# Patient Record
Sex: MALE | Race: WHITE | NOT HISPANIC OR LATINO | Employment: FULL TIME | URBAN - METROPOLITAN AREA
[De-identification: names, ages, dates, MRNs, and addresses within clinical notes are randomized per-mention and may not be internally consistent; named-entity substitution may affect disease eponyms.]

---

## 2018-02-09 ENCOUNTER — CONVERSION ENCOUNTER (OUTPATIENT)
Dept: SURGERY | Facility: CLINIC | Age: 38
End: 2018-02-09

## 2018-02-09 ENCOUNTER — PROCEDURE VISIT CONVERTED (OUTPATIENT)
Dept: UROLOGY | Facility: CLINIC | Age: 38
End: 2018-02-09
Attending: UROLOGY

## 2018-04-27 ENCOUNTER — OFFICE VISIT CONVERTED (OUTPATIENT)
Dept: UROLOGY | Facility: CLINIC | Age: 38
End: 2018-04-27
Attending: UROLOGY

## 2021-05-16 VITALS — RESPIRATION RATE: 14 BRPM | WEIGHT: 173 LBS | HEIGHT: 70 IN | BODY MASS INDEX: 24.77 KG/M2

## 2021-08-02 PROBLEM — K21.9 GERD (GASTROESOPHAGEAL REFLUX DISEASE): Status: ACTIVE | Noted: 2021-08-02

## 2021-08-02 PROBLEM — J45.909 ASTHMA: Status: ACTIVE | Noted: 2021-08-02

## 2021-08-02 RX ORDER — ESOMEPRAZOLE MAGNESIUM 40 MG/1
CAPSULE, DELAYED RELEASE ORAL
COMMUNITY
End: 2022-01-14

## 2021-08-02 RX ORDER — LORATADINE 10 MG/1
TABLET ORAL
COMMUNITY

## 2021-08-03 ENCOUNTER — OFFICE VISIT (OUTPATIENT)
Dept: GASTROENTEROLOGY | Facility: CLINIC | Age: 41
End: 2021-08-03

## 2021-08-03 ENCOUNTER — PREP FOR SURGERY (OUTPATIENT)
Dept: OTHER | Facility: HOSPITAL | Age: 41
End: 2021-08-03

## 2021-08-03 VITALS
SYSTOLIC BLOOD PRESSURE: 125 MMHG | DIASTOLIC BLOOD PRESSURE: 82 MMHG | BODY MASS INDEX: 25.56 KG/M2 | WEIGHT: 178.5 LBS | OXYGEN SATURATION: 97 % | HEART RATE: 99 BPM | HEIGHT: 70 IN

## 2021-08-03 DIAGNOSIS — K21.9 GASTROESOPHAGEAL REFLUX DISEASE WITHOUT ESOPHAGITIS: Primary | ICD-10-CM

## 2021-08-03 DIAGNOSIS — K21.9 GERD (GASTROESOPHAGEAL REFLUX DISEASE): Primary | ICD-10-CM

## 2021-08-03 DIAGNOSIS — R19.4 CHANGE IN BOWEL HABIT: ICD-10-CM

## 2021-08-03 DIAGNOSIS — R19.4 CHANGE IN BOWEL HABITS: ICD-10-CM

## 2021-08-03 PROCEDURE — 99204 OFFICE O/P NEW MOD 45 MIN: CPT | Performed by: INTERNAL MEDICINE

## 2021-08-03 NOTE — PROGRESS NOTES
"Chief Complaint      Manuel Collazo is a 41 y.o. male who presents to [facility]STROENTEROLOG[facility] for     Result Review :   The following data was reviewed by: Camilo Hussein MD on 08/03/2021:             Past Medical History:   Diagnosis Date   • Allergies    • Asthma    • GERD (gastroesophageal reflux disease)        Past Surgical History:   Procedure Laterality Date   • APPENDECTOMY     • ENDOSCOPY  2017   • SINUS SURGERY           Current Outpatient Medications:   •  esomeprazole (nexIUM) 40 MG capsule, Nexium 40 mg oral capsule,delayed release(DR/EC) take 1 capsule (40 mg) by oral route once daily   Active, Disp: , Rfl:   •  loratadine (Claritin) 10 MG tablet, Claritin 10 mg oral tablet take 1 tablet (10 mg) by oral route once daily   Active, Disp: , Rfl:      No Known Allergies    Family History   Problem Relation Age of Onset   • Diabetes Father    • Urolithiasis Paternal Grandmother    • Urolithiasis Paternal Grandfather         Social History     Social History Narrative   • Not on file       Review of Systems -all other systems reviewed and are otherwise negative except for that mentioned previously in the HPI    Objective     Vital Signs:   /82 (BP Location: Right arm, Patient Position: Sitting, Cuff Size: Adult)   Pulse 99   Ht 177.8 cm (70\")   Wt 81 kg (178 lb 8 oz)   SpO2 97%   BMI 25.61 kg/m²     Body mass index is 25.61 kg/m².    Physical Exam            Assessment and Plan    Diagnoses and all orders for this visit:    1. Gastroesophageal reflux disease without esophagitis (Primary)    2. Change in bowel habits                    Follow Up   No follow-ups on file.  Patient was given instructions and counseling regarding his condition or for health maintenance advice. Please see specific information pulled into the AVS if appropriate.     "

## 2021-08-03 NOTE — PROGRESS NOTES
Chief Complaint      Manuel Collazo is a 41 y.o. male who presents to [facility] GASTROENTEROLOGY for evaluation of altered bowel habits which has been ongoing now for several years.  Patient describes intermittent episodes of abdominal cramping with diarrhea.  It tends to be caused by certain foods such as salad and beans which he has now avoided.  He tends to have symptoms on average once every 1 to 2 weeks.  He has never had prior colonoscopy.  He denies any rectal bleeding.  The patient also has a history of hiatal hernia and acid reflux symptoms.  The symptoms tend to be worse when eating spicy foods or lying supine.  He takes Protonix daily and has good control with these medications.  If he misses the dose for more than 2 days he can have quick return of those reflux symptoms.  He describes a substernal burning with radiation upwards.  He denies any dysphagia or weight loss.  He had an upper endoscopy several years ago.  It was suggested at that time that he had eosinophilic esophagitis and dilation was performed.  Maximal dilation diameter was 16.5 mm.    Result Review :   The following data was reviewed by: Camilo Hussein MD on 08/03/2021:              Data reviewed: GI studies EGD from May 2017     Past Medical History:   Diagnosis Date   • Allergies    • Asthma    • GERD (gastroesophageal reflux disease)        Past Surgical History:   Procedure Laterality Date   • APPENDECTOMY     • ENDOSCOPY  2017   • SINUS SURGERY           Current Outpatient Medications:   •  esomeprazole (nexIUM) 40 MG capsule, Nexium 40 mg oral capsule,delayed release(DR/EC) take 1 capsule (40 mg) by oral route once daily   Active, Disp: , Rfl:   •  loratadine (Claritin) 10 MG tablet, Claritin 10 mg oral tablet take 1 tablet (10 mg) by oral route once daily   Active, Disp: , Rfl:      No Known Allergies    Family History   Problem Relation Age of Onset   • Diabetes Father    • Urolithiasis Paternal  "Grandmother    • Urolithiasis Paternal Grandfather         Social History     Social History Narrative   • Not on file       Review of Systems -all other systems reviewed and are otherwise negative except for that mentioned previously in the HPI    Objective     Vital Signs:   /82 (BP Location: Right arm, Patient Position: Sitting, Cuff Size: Adult)   Pulse 99   Ht 177.8 cm (70\")   Wt 81 kg (178 lb 8 oz)   SpO2 97%   BMI 25.61 kg/m²     Body mass index is 25.61 kg/m².    Physical Exam  Constitutional:       General: He is not in acute distress.     Appearance: Normal appearance. He is well-developed and normal weight.   Eyes:      Conjunctiva/sclera: Conjunctivae normal.      Pupils: Pupils are equal, round, and reactive to light.      Visual Fields: Right eye visual fields normal and left eye visual fields normal.   Cardiovascular:      Rate and Rhythm: Normal rate and regular rhythm.      Heart sounds: Normal heart sounds.   Pulmonary:      Effort: Pulmonary effort is normal. No retractions.      Breath sounds: Normal breath sounds and air entry.      Comments: Inspection of chest: normal appearance  Abdominal:      General: Bowel sounds are normal.      Palpations: Abdomen is soft.      Tenderness: There is no abdominal tenderness.      Comments: No appreciable hepatosplenomegaly   Musculoskeletal:      Cervical back: Neck supple.      Right lower leg: No edema.      Left lower leg: No edema.   Lymphadenopathy:      Cervical: No cervical adenopathy.   Skin:     Findings: No lesion.      Comments: Turgor normal   Neurological:      Mental Status: He is alert and oriented to person, place, and time.   Psychiatric:         Mood and Affect: Mood and affect normal.                 Assessment and Plan    Diagnoses and all orders for this visit:    1. Gastroesophageal reflux disease without esophagitis (Primary)    2. Change in bowel habits    I will schedule the patient for an upper endoscopy given his long " history of acid reflux symptoms and possible eosinophilic esophagitis in the past.  He will take Protonix daily.  For his altered bowel habits I think he benefit from a colonoscopy to rule out underlying Crohn's disease or colitis.  If his colonoscopy is unremarkable then I suspect he has irritable bowel syndrome and may benefit from a trial of fiber although he has done fairly well with simple avoidance of dietary triggers such as salads and beans.  I discussed risk and benefits performing endoscopy and he is willing to proceed.                Follow Up   No follow-ups on file.  Patient was given instructions and counseling regarding his condition or for health maintenance advice. Please see specific information pulled into the AVS if appropriate.

## 2022-01-14 ENCOUNTER — TELEPHONE (OUTPATIENT)
Dept: GASTROENTEROLOGY | Facility: CLINIC | Age: 42
End: 2022-01-14

## 2022-01-14 RX ORDER — PANTOPRAZOLE SODIUM 40 MG/1
40 TABLET, DELAYED RELEASE ORAL DAILY
COMMUNITY
End: 2022-02-08 | Stop reason: SDUPTHER

## 2022-01-14 NOTE — TELEPHONE ENCOUNTER
Patient notified per Dr Hussein's request to ensure the patient that endo will be open and number giver to patient to call in the event they could not make it.

## 2022-01-14 NOTE — PRE-PROCEDURE INSTRUCTIONS
Pt. Instructed on laxative and skin prep, pre-op meds, clear liquid diet. Ok to take Loratadine, Pantoprazole a.m. of procedure.       Covid vaccine card scanned to chart.

## 2022-01-17 ENCOUNTER — ANESTHESIA (OUTPATIENT)
Dept: GASTROENTEROLOGY | Facility: HOSPITAL | Age: 42
End: 2022-01-17

## 2022-01-17 ENCOUNTER — ANESTHESIA EVENT (OUTPATIENT)
Dept: GASTROENTEROLOGY | Facility: HOSPITAL | Age: 42
End: 2022-01-17

## 2022-01-17 ENCOUNTER — HOSPITAL ENCOUNTER (OUTPATIENT)
Facility: HOSPITAL | Age: 42
Setting detail: HOSPITAL OUTPATIENT SURGERY
Discharge: HOME | End: 2022-01-17
Attending: INTERNAL MEDICINE | Admitting: INTERNAL MEDICINE

## 2022-01-17 VITALS
DIASTOLIC BLOOD PRESSURE: 83 MMHG | TEMPERATURE: 97.2 F | WEIGHT: 176.81 LBS | BODY MASS INDEX: 25.31 KG/M2 | OXYGEN SATURATION: 99 % | RESPIRATION RATE: 21 BRPM | SYSTOLIC BLOOD PRESSURE: 106 MMHG | HEART RATE: 88 BPM | HEIGHT: 70 IN

## 2022-01-17 DIAGNOSIS — K21.9 GERD (GASTROESOPHAGEAL REFLUX DISEASE): ICD-10-CM

## 2022-01-17 DIAGNOSIS — R19.4 CHANGE IN BOWEL HABIT: ICD-10-CM

## 2022-01-17 PROCEDURE — 88305 TISSUE EXAM BY PATHOLOGIST: CPT | Performed by: INTERNAL MEDICINE

## 2022-01-17 PROCEDURE — 45378 DIAGNOSTIC COLONOSCOPY: CPT | Performed by: INTERNAL MEDICINE

## 2022-01-17 PROCEDURE — 43239 EGD BIOPSY SINGLE/MULTIPLE: CPT | Performed by: INTERNAL MEDICINE

## 2022-01-17 PROCEDURE — 25010000002 PROPOFOL 10 MG/ML EMULSION: Performed by: NURSE ANESTHETIST, CERTIFIED REGISTERED

## 2022-01-17 RX ORDER — PROPOFOL 10 MG/ML
VIAL (ML) INTRAVENOUS AS NEEDED
Status: DISCONTINUED | OUTPATIENT
Start: 2022-01-17 | End: 2022-01-17 | Stop reason: SURG

## 2022-01-17 RX ORDER — LIDOCAINE HYDROCHLORIDE 20 MG/ML
INJECTION, SOLUTION INFILTRATION; PERINEURAL AS NEEDED
Status: DISCONTINUED | OUTPATIENT
Start: 2022-01-17 | End: 2022-01-17 | Stop reason: SURG

## 2022-01-17 RX ORDER — SODIUM CHLORIDE, SODIUM LACTATE, POTASSIUM CHLORIDE, CALCIUM CHLORIDE 600; 310; 30; 20 MG/100ML; MG/100ML; MG/100ML; MG/100ML
1000 INJECTION, SOLUTION INTRAVENOUS CONTINUOUS
Status: DISCONTINUED | OUTPATIENT
Start: 2022-01-17 | End: 2022-01-17 | Stop reason: HOSPADM

## 2022-01-17 RX ORDER — SODIUM CHLORIDE, SODIUM LACTATE, POTASSIUM CHLORIDE, CALCIUM CHLORIDE 600; 310; 30; 20 MG/100ML; MG/100ML; MG/100ML; MG/100ML
30 INJECTION, SOLUTION INTRAVENOUS CONTINUOUS
Status: DISCONTINUED | OUTPATIENT
Start: 2022-01-17 | End: 2022-01-17 | Stop reason: HOSPADM

## 2022-01-17 RX ORDER — SODIUM CHLORIDE 0.9 % (FLUSH) 0.9 %
10 SYRINGE (ML) INJECTION EVERY 12 HOURS SCHEDULED
Status: DISCONTINUED | OUTPATIENT
Start: 2022-01-17 | End: 2022-01-17 | Stop reason: HOSPADM

## 2022-01-17 RX ORDER — MIDAZOLAM HYDROCHLORIDE 1 MG/ML
1 INJECTION INTRAMUSCULAR; INTRAVENOUS
Status: DISCONTINUED | OUTPATIENT
Start: 2022-01-17 | End: 2022-01-17

## 2022-01-17 RX ORDER — SODIUM CHLORIDE 0.9 % (FLUSH) 0.9 %
10 SYRINGE (ML) INJECTION AS NEEDED
Status: DISCONTINUED | OUTPATIENT
Start: 2022-01-17 | End: 2022-01-17 | Stop reason: HOSPADM

## 2022-01-17 RX ADMIN — LIDOCAINE HYDROCHLORIDE 80 MG: 20 INJECTION, SOLUTION INFILTRATION; PERINEURAL at 07:46

## 2022-01-17 RX ADMIN — PROPOFOL 80 MG: 10 INJECTION, EMULSION INTRAVENOUS at 07:46

## 2022-01-17 RX ADMIN — SODIUM CHLORIDE, POTASSIUM CHLORIDE, SODIUM LACTATE AND CALCIUM CHLORIDE 1000 ML: 600; 310; 30; 20 INJECTION, SOLUTION INTRAVENOUS at 06:39

## 2022-01-17 RX ADMIN — PROPOFOL 250 MCG/KG/MIN: 10 INJECTION, EMULSION INTRAVENOUS at 07:46

## 2022-01-17 NOTE — ANESTHESIA POSTPROCEDURE EVALUATION
Med refill:  irbesartan (AVAPRO) 300 mg tablet    [place] Patient: Manuel Collazo    Procedure Summary     Date: 01/17/22 Room / Location: [facility] ENDOSCOPY 2 / [facility] ENDOSCOPY    Anesthesia Start: 0744 Anesthesia Stop: 0822    Procedures:       ESOPHAGOGASTRODUODENOSCOPY WITH BIOPSY/DILATATION #48 SHEN (N/A )      COLONOSCOPY (N/A ) Diagnosis:       GERD (gastroesophageal reflux disease)      Change in bowel habit      (GERD (gastroesophageal reflux disease) [K21.9])      (Change in bowel habit [R19.4])    Surgeons: Camilo Hussein MD Provider: Selina Steiner MD    Anesthesia Type: general, MAC ASA Status: 2          Anesthesia Type: general, MAC    Vitals  Vitals Value Taken Time   /83 01/17/22 0844   Temp 36.2 °C (97.2 °F) 01/17/22 0844   Pulse 88 01/17/22 0844   Resp 21 01/17/22 0844   SpO2 99 % 01/17/22 0844           Post Anesthesia Care and Evaluation    Patient location during evaluation: bedside  Patient participation: complete - patient participated  Level of consciousness: awake  Pain score: 0  Pain management: adequate  Airway patency: patent  Anesthetic complications: No anesthetic complications  PONV Status: none  Cardiovascular status: acceptable and stable  Respiratory status: acceptable and room air  Hydration status: acceptable    Comments: An Anesthesiologist personally participated in the most demanding procedures (including induction and emergence if applicable) in the anesthesia plan, monitored the course of anesthesia administration at frequent intervals and remained physically present and available for immediate diagnosis and treatment of emergencies.

## 2022-01-17 NOTE — ANESTHESIA PREPROCEDURE EVALUATION
Anesthesia Evaluation     Patient summary reviewed and Nursing notes reviewed   no history of anesthetic complications:  NPO Solid Status: > 8 hours  NPO Liquid Status: > 2 hours           Airway   Mallampati: III  TM distance: <3 FB  Neck ROM: full  Possible difficult intubation and Narrow palate  Dental      Pulmonary - normal exam    breath sounds clear to auscultation  (+) asthma,  Cardiovascular - negative cardio ROS and normal exam  Exercise tolerance: good (4-7 METS)    Rhythm: regular        Neuro/Psych- negative ROS  GI/Hepatic/Renal/Endo    (+)  hiatal hernia, GERD,      Musculoskeletal (-) negative ROS    Abdominal    Substance History - negative use     OB/GYN negative ob/gyn ROS         Other - negative ROS                       Anesthesia Plan    ASA 2     general and MAC   (Patient understands anesthesia not responsible for dental damage.)  intravenous induction     Anesthetic plan, all risks, benefits, and alternatives have been provided, discussed and informed consent has been obtained with: patient.  Use of blood products discussed with patient .   Plan discussed with CRNA.

## 2022-01-17 NOTE — H&P
"Pre Procedure History & Physical    Chief Complaint:   gerd  Change in bowel habits    Subjective     HPI:   As above    Past Medical History:   Past Medical History:   Diagnosis Date   • Allergies    • Anesthesia complication     rash on chest x1   • Asthma     in high school   • GERD (gastroesophageal reflux disease)    • Hiatal hernia        Past Surgical History:  Past Surgical History:   Procedure Laterality Date   • APPENDECTOMY     • ENDOSCOPY  2017   • SINUS SURGERY     • TONSILLECTOMY         Family History:  Family History   Problem Relation Age of Onset   • Diabetes Father    • Urolithiasis Paternal Grandmother    • Urolithiasis Paternal Grandfather    • Malig Hyperthermia Neg Hx        Social History:   reports that he has never smoked. His smokeless tobacco use includes snuff. He reports current alcohol use. He reports that he does not use drugs.    Medications:   Medications Prior to Admission   Medication Sig Dispense Refill Last Dose   • loratadine (Claritin) 10 MG tablet Claritin 10 mg oral tablet take 1 tablet (10 mg) by oral route once daily   Active   1/16/2022 at Unknown time   • pantoprazole (PROTONIX) 40 MG EC tablet Take 40 mg by mouth Daily.   1/16/2022 at Unknown time       Allergies:  Patient has no known allergies.        Objective     Blood pressure 117/85, pulse 74, temperature 97.5 °F (36.4 °C), temperature source Temporal, resp. rate 16, height 177.8 cm (70\"), weight 80.2 kg (176 lb 12.9 oz), SpO2 97 %.    Physical Exam   Constitutional: Pt is oriented to person, place, and time and well-developed, well-nourished, and in no distress.   Mouth/Throat: Oropharynx is clear and moist.   Neck: Normal range of motion.   Cardiovascular: Normal rate, regular rhythm and normal heart sounds.    Pulmonary/Chest: Effort normal and breath sounds normal.   Abdominal: Soft. Nontender  Skin: Skin is warm and dry.   Psychiatric: Mood, memory, affect and judgment normal.     Assessment/Plan "     Diagnosis:  gerd  Change in bowel habits    Anticipated Surgical Procedure:  egd  colonoscopy    The risks, benefits, and alternatives of this procedure have been discussed with the patient or the responsible party- the patient understands and agrees to proceed.

## 2022-01-18 LAB
CYTO UR: NORMAL
LAB AP CASE REPORT: NORMAL
LAB AP CLINICAL INFORMATION: NORMAL
LAB AP DIAGNOSIS COMMENT: NORMAL
PATH REPORT.FINAL DX SPEC: NORMAL
PATH REPORT.GROSS SPEC: NORMAL

## 2022-01-21 ENCOUNTER — TELEPHONE (OUTPATIENT)
Dept: GASTROENTEROLOGY | Facility: CLINIC | Age: 42
End: 2022-01-21

## 2022-01-21 NOTE — TELEPHONE ENCOUNTER
Spoke to pt and informed of Barb AWAN note. Educated pt on Eosinophilic Esophagitis. Pt verified understanding. Instructed pt to call office and f/u PRN if any issues arise. [facility] mailed to pt.

## 2022-01-21 NOTE — TELEPHONE ENCOUNTER
----- Message from Barb Garrido NP sent at 1/20/2022 10:41 AM EST -----  Eosinophilic esophagitis.  Please educate the patient.

## 2022-02-08 DIAGNOSIS — K21.9 GASTROESOPHAGEAL REFLUX DISEASE WITHOUT ESOPHAGITIS: Primary | ICD-10-CM

## 2022-02-08 RX ORDER — ESOMEPRAZOLE MAGNESIUM 40 MG/1
40 CAPSULE, DELAYED RELEASE ORAL DAILY
Qty: 90 CAPSULE | Refills: 3 | Status: SHIPPED | OUTPATIENT
Start: 2022-02-08

## 2022-02-08 NOTE — TELEPHONE ENCOUNTER
Patient called the office requesting his Protonix be changed to Esomeprazole. Per Dr. Hussein, OK to change.

## 2024-04-04 ENCOUNTER — OFFICE VISIT (OUTPATIENT)
Dept: UROLOGY | Facility: CLINIC | Age: 44
End: 2024-04-04
Payer: COMMERCIAL

## 2024-04-04 VITALS
HEART RATE: 90 BPM | HEIGHT: 70 IN | SYSTOLIC BLOOD PRESSURE: 110 MMHG | WEIGHT: 176 LBS | DIASTOLIC BLOOD PRESSURE: 83 MMHG | BODY MASS INDEX: 25.2 KG/M2

## 2024-04-04 DIAGNOSIS — N52.9 ERECTILE DYSFUNCTION, UNSPECIFIED ERECTILE DYSFUNCTION TYPE: Primary | ICD-10-CM

## 2024-04-04 DIAGNOSIS — K40.90 LEFT INGUINAL HERNIA: ICD-10-CM

## 2024-04-04 LAB
BILIRUB BLD-MCNC: NEGATIVE MG/DL
CLARITY, POC: CLEAR
COLOR UR: YELLOW
EXPIRATION DATE: NORMAL
GLUCOSE UR STRIP-MCNC: NEGATIVE MG/DL
KETONES UR QL: NEGATIVE
LEUKOCYTE EST, POC: NEGATIVE
Lab: NORMAL
NITRITE UR-MCNC: NEGATIVE MG/ML
PH UR: 6 [PH] (ref 5–8)
PROT UR STRIP-MCNC: NEGATIVE MG/DL
RBC # UR STRIP: NEGATIVE /UL
SP GR UR: 1.01 (ref 1–1.03)
UROBILINOGEN UR QL: NORMAL

## 2024-04-04 RX ORDER — SIMVASTATIN 20 MG
1 TABLET ORAL DAILY
COMMUNITY
Start: 2024-02-10

## 2024-04-04 RX ORDER — SILDENAFIL 25 MG/1
TABLET, FILM COATED ORAL
COMMUNITY
Start: 2024-02-19

## 2024-04-04 NOTE — PROGRESS NOTES
"Chief Complaint  Erectile Dysfunction (He addressed his erectile dysfunction about a month ago and they referred him here for his erectile dysfunction)    Subjective          Manuel Collazo is a 44 y.o.  male who presents to [facility] UROLOGY  History of Present Illness  Referral per pcp Lucy Rios MD for evaluation of erectile dysfunction. Referral initially place 1 month ago to Dr Randolph urologist who had previously seen patient for vasectomy 2018. Patient stating the Dr Rios's nurse practitioner provided prescription for the generic did provide sildenafil 25mg since the time of referral. Medication at home and has been fearful to try it. He and spouse had some concerns hearing that viagra could be harmful and could cause \"heart attach\". Here today just to get more information and to ask more questions and address concerns using medication.  Patient wanting to see if natural otc items available and  general questions regarding testosterone levels and male sexual health. Patient has good libido and no problems with obtaining firm erections most times. More of problem maintaining the erection stating that at certain times during act \"I will get tired and then loose erection\" then has to rest and wait awhile to get another erection\". It does not always occur. 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        Objective   Vital Signs:   /83 (BP Location: Left arm, Patient Position: Sitting, Cuff Size: Adult)   Pulse 90   Ht 177.8 cm (70\")   Wt 79.8 kg (176 lb)   BMI 25.25 kg/m²     No Known Allergies   Past medical history:  has a past medical history of Allergies, Anesthesia complication, Asthma, GERD (gastroesophageal reflux disease), and Hiatal hernia.   Past surgical history:  has a past surgical history that includes Appendectomy; Esophagogastroduodenoscopy (2017); Sinus surgery; Tonsillectomy; " "Esophagogastroduodenoscopy (N/A, 01/17/2022); Colonoscopy (N/A, 01/17/2022); and Vasectomy.  Personal history: family history includes Diabetes in his father; Urolithiasis in his paternal grandfather and paternal grandmother.  Social history:  reports that he has never smoked. His smokeless tobacco use includes snuff. He reports current alcohol use. He reports that he does not use drugs.    Review of Systems   Constitutional:  Negative for chills, fatigue and fever.   Genitourinary:  Negative for difficulty urinating and scrotal swelling.        Physical Exam  Vitals and nursing note reviewed.   Constitutional:       General: He is not in acute distress.     Appearance: Normal appearance. He is well-developed. He is not ill-appearing.      Comments: Ambulates without difficulty   Cardiovascular:      Rate and Rhythm: Normal rate and regular rhythm.   Pulmonary:      Effort: Pulmonary effort is normal.      Breath sounds: Normal breath sounds and air entry.   Abdominal:      General: There is no distension.      Tenderness: There is no abdominal tenderness. There is no guarding or rebound.   Genitourinary:     Comments: Prostate approximately 20g no nodules . Small left inguinal hernia   Musculoskeletal:         General: Normal range of motion.   Skin:     General: Skin is warm and dry.   Neurological:      Mental Status: He is alert and oriented to person, place, and time.      Motor: Motor function is intact.   Psychiatric:         Mood and Affect: Mood normal.         Behavior: Behavior normal. Behavior is cooperative.         Thought Content: Thought content normal.         Judgment: Judgment normal.      Comments: anxious        Result Review :         No results found for: \"PSA\"  No results found for: \"TESTOSTEROTT\", \"TESTFRE\", \"SEXMONB\"      Results for orders placed or performed in visit on 04/04/24   POC Urinalysis Dipstick, Automated    Specimen: Urine   Result Value Ref Range    Color Yellow Yellow, Straw, " Dark Yellow, Radha    Clarity, UA Clear Clear    Specific Gravity  1.015 1.005 - 1.030    pH, Urine 6.0 5.0 - 8.0    Leukocytes Negative Negative    Nitrite, UA Negative Negative    Protein, POC Negative Negative mg/dL    Glucose, UA Negative Negative mg/dL    Ketones, UA Negative Negative    Urobilinogen, UA 0.2 E.U./dL Normal, 0.2 E.U./dL    Bilirubin Negative Negative    Blood, UA Negative Negative    Lot Number [id number]     Expiration Date 2/2,824             Assessment and Plan    Diagnoses and all orders for this visit:    1. Erectile dysfunction, unspecified erectile dysfunction type (Primary)  -     POC Urinalysis Dipstick, Automated    2. Left inguinal hernia      Answered all question regarding PDE5I medications and also sildenafil usage, side effect, safety precautions. Patient has the 25mg at home from pcp and will try. Advised to take on empty stomach in order to enter system more quickly and to be taken prior to activity as prescribed per primary care provider.  Insurance quantity limit covering only  6 tablets/30 days assumed  as per patient report. Provided Verbal information and instructions on use of good rx and by quantity limit. Coupon provided. Patient wanting prescription for erectile dysfunction sent to  [facility] pharmacy for privacy.  Plans of discussing with pcp provider so may have prescription written differently next fill. Plans to try 1 tablet and if not effective plans of trying 2. If 2 then may change rx to 50mg tab. Patient to call if problems filling medication per pcp.  TEJAL and exam perform.     Small left inguinal hernia discover during exam. Conservative treatment. If ever becomes enlarged or problematic future patient  informed may have pcp refer to general surgery for evaluation . Will see on prn basis as pcp prescribing sildenafil.    Patient testosterone level per pcp above 300 and wnl. No associated symptoms. He had good libido.       I spent 40+ minutes caring for Manuel on  this date of service. This time includes time spent by me in the following activities:reviewing tests, obtaining and/or reviewing a separately obtained history, performing a medically appropriate examination and/or evaluation , counseling and educating the patient/family/caregiver, ordering medications, tests, or procedures, referring and communicating with other health care professionals , documenting information in the medical record, independently interpreting results and communicating that information with the patient/family/caregiver, and most of time spent on verbal conversation and answering multiple questions and concerns       Follow Up   Return for patient plans to call for follow up as needed.  Patient was given instructions and counseling regarding his condition or for health maintenance advice. Please see specific information pulled into the AVS if appropriate.     Gogo Larsen, APRN

## (undated) DEVICE — COLON KIT: Brand: MEDLINE INDUSTRIES, INC.

## (undated) DEVICE — EGD OR ERCP KIT: Brand: MEDLINE INDUSTRIES, INC.

## (undated) DEVICE — Device: Brand: DEFENDO AIR/WATER/SUCTION AND BIOPSY VALVE

## (undated) DEVICE — SOL IRRG H2O PL/BG 1000ML STRL

## (undated) DEVICE — SINGLE-USE BIOPSY FORCEPS: Brand: RADIAL JAW 4